# Patient Record
Sex: FEMALE | URBAN - METROPOLITAN AREA
[De-identification: names, ages, dates, MRNs, and addresses within clinical notes are randomized per-mention and may not be internally consistent; named-entity substitution may affect disease eponyms.]

---

## 2018-02-07 ENCOUNTER — PAT TELEPHONE (OUTPATIENT)
Dept: PREADMISSION TESTING | Age: 33
End: 2018-02-07

## 2018-02-07 VITALS — WEIGHT: 206 LBS | HEIGHT: 69 IN | BODY MASS INDEX: 30.51 KG/M2

## 2018-02-07 RX ORDER — FEXOFENADINE HCL 180 MG/1
180 TABLET ORAL DAILY
COMMUNITY

## 2018-02-07 NOTE — PRE-PROCEDURE INSTRUCTIONS
[address] [facility] time___1330_________        Surgery time_____1500_______    Take the following medications with a sip of water: NONE      Do not eat or drink anything after 12:00 midnight prior to your surgery. This includes water chewing gum, mints and ice chips. You may brush your teeth and gargle the morning of your surgery, but do not swallow the water     Please see your family doctor/pediatrician for a history and physical and/or concerning medications. Bring any test results/reports from your physicians office. If you are under the care of a heart doctor or specialist doctor, please be aware that you may be asked to them for clearance    You may be asked to stop blood thinners such as Coumadin, Plavix, Fragmin, Lovenox, etc., or any anti-inflammatories such as:  Aspirin, Ibuprofen, Advil, Naproxen prior to your surgery. We also ask that you stop any OTC medications such as fish oil, vitamin E, glucosamine, garlic, Multivitamins, COQ 10, etc.    We ask that you do not smoke 24 hours prior to surgery  We ask that you do not  drink any alcoholic beverages 24 hours prior to surgery     You must make arrangements for a responsible adult to take you home after your surgery. For your safety you will not be allowed to leave alone or drive yourself home. Your surgery will be cancelled if you do not have a ride home. Also for your safety, it is strongly suggested that someone stay with you the first 24 hours after your surgery. A parent or legal guardian must accompany a child scheduled for surgery and plan to stay at the hospital until the child is discharged. Please do not bring other children with you. For your comfort, please wear simple loose fitting clothing to the hospital.  Please do not bring valuables.     Do not wear any make-up or nail polish on your fingers or toes      For your safety, please do not wear any jewelry or body piercing's

## 2018-02-15 ENCOUNTER — HOSPITAL ENCOUNTER (OUTPATIENT)
Dept: ENDOSCOPY | Age: 33
Discharge: HOME | End: 2018-02-15
Attending: INTERNAL MEDICINE | Admitting: INTERNAL MEDICINE

## 2018-02-15 VITALS
RESPIRATION RATE: 16 BRPM | OXYGEN SATURATION: 98 % | WEIGHT: 206 LBS | DIASTOLIC BLOOD PRESSURE: 74 MMHG | TEMPERATURE: 97.6 F | HEART RATE: 72 BPM | BODY MASS INDEX: 30.51 KG/M2 | SYSTOLIC BLOOD PRESSURE: 114 MMHG | HEIGHT: 69 IN

## 2018-02-15 DIAGNOSIS — K80.00 CALCULUS OF GALLBLADDER WITH ACUTE CHOLECYSTITIS WITHOUT OBSTRUCTION: ICD-10-CM

## 2018-02-15 LAB — PREGNANCY, URINE: NEGATIVE

## 2018-02-15 RX ORDER — OXYCODONE HYDROCHLORIDE AND ACETAMINOPHEN 5; 325 MG/1; MG/1
2 TABLET ORAL PRN
Status: ACTIVE | OUTPATIENT
Start: 2018-02-15 | End: 2018-02-15

## 2018-02-15 RX ORDER — OXYCODONE HYDROCHLORIDE AND ACETAMINOPHEN 5; 325 MG/1; MG/1
1 TABLET ORAL PRN
Status: ACTIVE | OUTPATIENT
Start: 2018-02-15 | End: 2018-02-15

## 2018-02-15 RX ORDER — FENTANYL CITRATE 50 UG/ML
50 INJECTION, SOLUTION INTRAMUSCULAR; INTRAVENOUS EVERY 5 MIN PRN
Status: DISCONTINUED | OUTPATIENT
Start: 2018-02-15 | End: 2018-02-16 | Stop reason: HOSPADM

## 2018-02-15 RX ORDER — MORPHINE SULFATE 2 MG/ML
2 INJECTION, SOLUTION INTRAMUSCULAR; INTRAVENOUS EVERY 5 MIN PRN
Status: DISCONTINUED | OUTPATIENT
Start: 2018-02-15 | End: 2018-02-16 | Stop reason: HOSPADM

## 2018-02-15 RX ORDER — MEPERIDINE HYDROCHLORIDE 25 MG/ML
12.5 INJECTION INTRAMUSCULAR; INTRAVENOUS; SUBCUTANEOUS EVERY 5 MIN PRN
Status: DISCONTINUED | OUTPATIENT
Start: 2018-02-15 | End: 2018-02-16 | Stop reason: HOSPADM

## 2018-02-15 RX ORDER — MORPHINE SULFATE 2 MG/ML
1 INJECTION, SOLUTION INTRAMUSCULAR; INTRAVENOUS EVERY 5 MIN PRN
Status: DISCONTINUED | OUTPATIENT
Start: 2018-02-15 | End: 2018-02-16 | Stop reason: HOSPADM

## 2018-02-15 RX ORDER — SODIUM CHLORIDE 0.9 % (FLUSH) 0.9 %
10 SYRINGE (ML) INJECTION PRN
Status: DISCONTINUED | OUTPATIENT
Start: 2018-02-15 | End: 2018-02-16 | Stop reason: HOSPADM

## 2018-02-15 RX ORDER — ONDANSETRON 2 MG/ML
4 INJECTION INTRAMUSCULAR; INTRAVENOUS
Status: ACTIVE | OUTPATIENT
Start: 2018-02-15 | End: 2018-02-15

## 2018-02-15 RX ORDER — SODIUM CHLORIDE 9 MG/ML
INJECTION, SOLUTION INTRAVENOUS CONTINUOUS
Status: DISCONTINUED | OUTPATIENT
Start: 2018-02-15 | End: 2018-02-16 | Stop reason: HOSPADM

## 2018-02-15 RX ORDER — FENTANYL CITRATE 50 UG/ML
25 INJECTION, SOLUTION INTRAMUSCULAR; INTRAVENOUS EVERY 5 MIN PRN
Status: DISCONTINUED | OUTPATIENT
Start: 2018-02-15 | End: 2018-02-16 | Stop reason: HOSPADM

## 2018-02-15 RX ORDER — SODIUM CHLORIDE 0.9 % (FLUSH) 0.9 %
10 SYRINGE (ML) INJECTION EVERY 12 HOURS SCHEDULED
Status: DISCONTINUED | OUTPATIENT
Start: 2018-02-15 | End: 2018-02-16 | Stop reason: HOSPADM

## 2018-02-15 RX ADMIN — SODIUM CHLORIDE: 9 INJECTION, SOLUTION INTRAVENOUS at 14:48

## 2018-02-15 ASSESSMENT — PAIN SCALES - GENERAL
PAINLEVEL_OUTOF10: 0
PAINLEVEL_OUTOF10: 0

## 2018-02-15 ASSESSMENT — PAIN - FUNCTIONAL ASSESSMENT: PAIN_FUNCTIONAL_ASSESSMENT: 0-10

## 2018-02-15 ASSESSMENT — LIFESTYLE VARIABLES: SMOKING_STATUS: 0

## 2018-02-15 NOTE — OP NOTE
[address] [place] [address]  Endoscopy Note    Patient: Karol Nascimento   : 1985  Acct#:     Procedure: Endoscopic retrograde cholangiopancreatography with stent removal  ERCP with balloon sweeps    Date: 2/15/2018    Surgeon:  Cat Matute MD    Referring Physician:  Dr. Mayuri Phillip    Anesthesia:  General per Anesthesia. Indications:  27 y.o. With bile duct stones identified at the [facility]. Stones were removed after biliary sphincterotomy. A stent was placed. Subsequently she had cholecystectomy with Dr. Mayuri Phillip. Plan is for ERCP with stent removal and balloon sweeps to assure clearance of the duct. Consent: Informed consent was obtained from the patient after explanation of indications, benefits, possible risks and complications of the procedure. Specifically the risk of bleeding, infection, perforation, pancreatitis, anesthesia complications, and remote possibility of mortality among others were explained. Monitoring: Patient was monitored with continuous pulse oximetry, telemetry, and intermittent blood pressures. Procedure:   A time-out was performed. The patient and staff were in agreement as to the correct patient and procedure. The above anesthesia was administered by the anesthesia department. The patient was placed in the left lateral prone position. The Olympus  F Video therapeutic duodenoscope was placed in the patient's mouth and blindly advanced into the esophagus. The scope was then advanced through the esophagus, stomach and into the second portion of the duodenum where the major papilla was visualized. Major Papilla: Biliary stent was identified in place. This was grasped with a snare and removed through the scope. There was evidence of a wide-open biliary sphincterotomy.  Film:  Clips in the RUQ. Cholangiogram:  The biliary orifice was then selectively cannulated with a 9-12 extraction balloon.  Contrast was injected and revealed a normal cholangiogram other than some air bubbles in the bile duct. Occlusion cholangiogram was performed and negative for filling defects or bile leaks. No evidence of retained stones or bile leak. Next, multiple balloon sweeps were performed and negative for stones. Pancreatogram:  The pancreatic duct was not cannulated. The cannulas were then withdrawn. The duodenum and stomach were decompressed and the scope was withdrawn from the patient. The patient tolerated the procedure well and was taken to the post anesthesia care unit in good condition. Radiological Interpretation:  All fluoroscopic images and still x-ray films were carefullly examined and interpreted by the endoscopist on the spot during the procedure in the absence of radiologist.  These interpretations guided the course of the procedure and the interventions mentioned above and below. Estimated blood loss: minimal  Specimens taken: none      Impression:  1. Successful stent removal.  Cholangiogram and balloon sweeps negative for retained stones. Recommendations:  1. Clear diet. If does well overnight can advance diet as tolerated tomorrow. 2.  Follow up as needed.     Davin Coughlin  2/15/2018

## 2018-02-15 NOTE — PROGRESS NOTES
Alert and oriented. Agreeable to procedure. Consent signed by pt.   Electronically signed by Yvette Long RN on 2/15/2018 at 2:52 PM
Alert, no complaints
established preoperatively. 23.  The patient/caregiver demonstrates knowledge of the expected responses to the operative or invasive procedure. 20.  Patient/Caregiver has reduced anxiety. Interventions-Familiarize with environment and equipment.   21.  Other:  22.  Other:

## 2018-02-15 NOTE — ANESTHESIA POST-OP
[facility] Department of Anesthesiology  Post-Anesthesia Note       Name:  Larisa Kemp                                  Age:  27 y.o. MRN:  [id number]     Last Vitals & Oxygen Saturation: /74   Pulse 72   Temp 97.6 °F (36.4 °C) (Temporal)   Resp 16   Ht 5' 9\" (1.753 m)   Wt 206 lb (93.4 kg)   LMP 02/07/2016   SpO2 98%   BMI 30.42 kg/m²   Patient Vitals for the past 4 hrs:   BP Temp Temp src Pulse Resp SpO2 Height Weight   02/15/18 1706 114/74 - - 72 16 98 % - -   02/15/18 1645 126/77 97.6 °F (36.4 °C) Temporal 68 16 99 % - -   02/15/18 1632 112/78 97.8 °F (36.6 °C) - 65 16 100 % - -   02/15/18 1627 118/77 - - 77 21 100 % - -   02/15/18 1626 - - - 80 - - - -   02/15/18 1622 118/83 - - 87 24 97 % - -   02/15/18 1620 (!) 103/59 96.9 °F (36.1 °C) Temporal 88 18 99 % - -   02/15/18 1431 124/70 97.3 °F (36.3 °C) Temporal 78 15 99 % 5' 9\" (1.753 m) 206 lb (93.4 kg)       Level of consciousness:  Awake, alert    Respiratory: Respirations easy, no distress. Stable. Cardiovascular: Hemodynamically stable. Hydration: Adequate. PONV: Adequately managed. Post-op pain: Adequately controlled. Post-op assessment: Tolerated anesthetic well without complication. Complications:  None.     Yessica Orozco MD  February 15, 2018   5:48 PM

## 2018-02-15 NOTE — ANESTHESIA PRE-OP
Department of Anesthesiology  Preprocedure Note       Name:  Ruma Feliciano   Age:  27 y.o.  :  1985                                          MRN:  [id number]         Date:  2/15/2018      [facility] Department of Anesthesiology  Pre-Anesthesia Evaluation/Consultation       Name:  Ruma Feliciano                                         Age:  27 y.o. MRN:  [id number]           Procedure (Scheduled):  ERCP  Surgeon:  Dr. Marck Hyde     No Known Allergies  There is no problem list on file for this patient. No past medical history on file. Past Surgical History:   Procedure Laterality Date    CHOLECYSTECTOMY  2018    WISDOM TOOTH EXTRACTION       Social History   Substance Use Topics    Smoking status: Former Smoker     Quit date: 2016    Smokeless tobacco: Former User    Alcohol use Yes      Comment: OCCASIONALLY     Medications  Current Outpatient Prescriptions on File Prior to Encounter   Medication Sig Dispense Refill    fexofenadine (ALLEGRA ALLERGY) 180 MG tablet Take 180 mg by mouth daily       No current facility-administered medications on file prior to encounter.       Current Outpatient Prescriptions   Medication Sig Dispense Refill    fexofenadine (ALLEGRA ALLERGY) 180 MG tablet Take 180 mg by mouth daily       Current Facility-Administered Medications   Medication Dose Route Frequency Provider Last Rate Last Dose    0.9 % sodium chloride infusion   Intravenous Continuous Janell Paris MD        sodium chloride flush 0.9 % injection 10 mL  10 mL Intravenous 2 times per day Janell Paris MD        sodium chloride flush 0.9 % injection 10 mL  10 mL Intravenous PRN Janell Paris MD        famotidine (PEPCID) injection 20 mg  20 mg Intravenous Once Janell Paris MD        fentaNYL (SUBLIMAZE) injection 25 mcg  25 mcg Intravenous Q5 Min PRN Sanna Dubois MD        fentaNYL (SUBLIMAZE) injection 50 mcg  50 mcg Intravenous Q5 Min PRN Sanna Dubois MD       [facility] morphine (PF) injection 1 mg  1 mg Intravenous Q5 Min PRN Fredrick Multani MD        morphine (PF) injection 2 mg  2 mg Intravenous Q5 Min PRN Fredrick Multani MD        oxyCODONE-acetaminophen (PERCOCET) 5-325 MG per tablet 1 tablet  1 tablet Oral PRN Fredrick Multani MD        Or    oxyCODONE-acetaminophen (PERCOCET) 5-325 MG per tablet 2 tablet  2 tablet Oral PRN Fredrick Multani MD        ondansetron [facility]) injection 4 mg  4 mg Intravenous Once PRN Fredrick uMltani MD        meperidine (DEMEROL) injection 12.5 mg  12.5 mg Intravenous Q5 Min PRN Fredrick Multani MD         Vital Signs (Current) There were no vitals filed for this visit. Vital Signs Statistics (for past 48 hrs)     No Data Recorded    BP Readings from Last 3 Encounters:   No data found for BP     BMI  There is no height or weight on file to calculate BMI. Estimated body mass index is 30.42 kg/m² as calculated from the following:    Height as of 2/7/18: 5' 9\" (1.753 m). Weight as of 2/7/18: 206 lb (93.4 kg). CBC No results found for: WBC, RBC, HGB, HCT, MCV, RDW, PLT  CMP  No results found for: NA, K, CL, CO2, BUN, CREATININE, GFRAA, AGRATIO, LABGLOM, GLUCOSE, PROT, CALCIUM, BILITOT, ALKPHOS, AST, ALT  BMP  No results found for: NA, K, CL, CO2, BUN, CREATININE, CALCIUM, GFRAA, LABGLOM, GLUCOSE  POCGlucose  No results for input(s): GLUCOSE in the last 72 hours. Coags  No results found for: PROTIME, INR, APTT  HCG (If Applicable) No results found for: PREGTESTUR, PREGSERUM, HCG, HCGQUANT   ABGs No results found for: PHART, PO2ART, [id number], ZWH3OVZ, BEART, G2FZOSQI   Type & Screen (If Applicable)  No results found for: [facility]    Surgeon:    Procedure:    Medications prior to admission:   Prior to Admission medications    Medication Sig Start Date End Date Taking?  Authorizing Provider   fexofenadine (ALLEGRA ALLERGY) 180 MG tablet Take 180 mg by mouth daily    Historical Provider, 15, 2018  2:07 PM      Susan Gambino MD   2/15/2018